# Patient Record
Sex: FEMALE
[De-identification: names, ages, dates, MRNs, and addresses within clinical notes are randomized per-mention and may not be internally consistent; named-entity substitution may affect disease eponyms.]

---

## 2020-11-19 ENCOUNTER — HOSPITAL ENCOUNTER (EMERGENCY)
Dept: HOSPITAL 97 - ER | Age: 50
Discharge: HOME | End: 2020-11-19
Payer: SELF-PAY

## 2020-11-19 VITALS — OXYGEN SATURATION: 99 % | SYSTOLIC BLOOD PRESSURE: 113 MMHG | TEMPERATURE: 98.2 F | DIASTOLIC BLOOD PRESSURE: 67 MMHG
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
## 2020-11-19 DIAGNOSIS — Z98.84: ICD-10-CM
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
## 2020-11-19 DIAGNOSIS — F32.9: ICD-10-CM
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
## 2020-11-19 DIAGNOSIS — S61.215A: Primary | ICD-10-CM

## 2020-11-19 PROCEDURE — 0JQK0ZZ REPAIR LEFT HAND SUBCUTANEOUS TISSUE AND FASCIA, OPEN APPROACH: ICD-10-PCS

## 2020-11-19 PROCEDURE — 99284 EMERGENCY DEPT VISIT MOD MDM: CPT

## 2020-11-19 NOTE — EDPHYS
Physician Documentation                                                                           

 [facility]                                                                 

Name: Gladis Reeves                                                                            

Age: 50 yrs                                                                                       

Sex: Female                                                                                       

: 1970                                                                                   

MRN: [id number]                                                                                   

Arrival Date: 2020                                                                          

Time: 09:03                                                                                       

Account#: [id number]                                                                            

Bed 13                                                                                            

Private MD:                                                                                       

Leo Blanco                                                                      

HPI:                                                                                              

                                                                                             

10:03 This 50 yrs old  Female presents to ER via Ambulatory with complaints of Finger kb  

      Injury - laceration.                                                                        

10:03 The patient has a laceration related to: cooking, from a knife, occurred at home, and   kb  

      there are no complicating factors. The injury was accidental. The laceration(s) is(are)     

      located on the palmar aspect of proximal phalanx of left ring finger. Onset: The            

      symptoms/episode began/occurred just prior to arrival. Associated signs and symptoms:       

      The patient has no apparent associated signs or symptoms. The patient has not               

      experienced similar symptoms in the past. The patient has not recently seen a physician.    

                                                                                                  

OB/GYN:                                                                                           

: LMP N/A - Post-menopause                                                                iw  

                                                                                                  

Historical:                                                                                       

- Allergies:                                                                                      

: No Known Allergies;                                                                     iw  

- Home Meds:                                                                                      

 Zoloft 100 mg Oral tab 1 tab once daily [Active]; Iron CR Oral [Active]; atenolol 12.5  iw  

      Oral tab 1 tab once daily [Active];                                                         

- PMHx:                                                                                           

 Anemia; Depression;                                                                     iw  

- PSHx:                                                                                           

 Gastric Bypass; Appendectomy;                                                           iw  

                                                                                                  

- Immunization history:: Adult Immunizations up to date, Last tetanus immunization: <             

  10 years ago.                                                                                   

- Social history:: Smoking status: Patient/guardian denies using.                                 

                                                                                                  

                                                                                                  

ROS:                                                                                              

10:02 Constitutional: Negative for fever, chills, and weight loss, MS/Extremity: Negative for kb  

      injury and deformity, Neuro: Negative for headache, weakness, numbness, tingling, and       

      seizure.                                                                                    

10:02 Skin: Positive for laceration(s), of the palmar aspect of proximal phalanx of left ring     

      finger.                                                                                     

                                                                                                  

Exam:                                                                                             

10:02 Constitutional:  This is a well developed, well nourished patient who is awake, alert,  kb  

      and in no acute distress. Head/Face:  Normocephalic, atraumatic. MS/ Extremity:  Pulses     

      equal, no cyanosis.  Neurovascular intact.  Full, normal range of motion. Neuro:  Awake     

      and alert, GCS 15, oriented to person, place, time, and situation.  Cranial nerves          

      II-XII grossly intact.  Motor strength 5/5 in all extremities.  Sensory grossly intact.     

       Cerebellar exam normal.  Normal gait.                                                      

10:02 Respiratory: the patient does not display signs of respiratory distress,  Respirations:     

      normal.                                                                                     

10:02 Skin: injury, laceration(s), the wound is approximately  2 cm(s), of the palmar aspect      

      of proximal phalanx of left ring finger, that can be described as clean, no foreign         

      body, linear, without bleeding.                                                             

                                                                                                  

Vital Signs:                                                                                      

09:16  / 67; Pulse 90; Resp 18; Temp 98.2; Pulse Ox 99% on R/A; Weight 82.55 kg; Height iw  

      5 ft. 7 in. (170.18 cm); Pain 9/10;                                                         

09:16 Body Mass Index 28.50 (82.55 kg, 170.18 cm)                                             iw  

                                                                                                  

Laceration:                                                                                       

09:55 Wound Repair of 2cm ( 0.8in ) subcutaneous laceration to palmar aspect of proximal      kb  

      phalanx of left ring finger. Linear shaped.. Distal neuro/vascular/tendon intact.           

      Anesthesia: Wound infiltrated with 1 mls of 1% lidocaine. Wound prep: Extensive             

      cleansing with hibiclenz by me, Wound irrigation with saline by me, Wound explored.         

      Skin closed with 4 5-0 Prolene using simple sutures and sterile technique. Dressed with     

      non-adherent dressing. Patient tolerated well.                                              

                                                                                                  

MDM:                                                                                              

09:30 Patient medically screened.                                                             kb  

09:56 Data reviewed: vital signs, nurses notes. Data interpreted: Pulse oximetry: on room air kb  

      is 99 %. Interpretation: normal. Counseling: I had a detailed discussion with the           

      patient and/or guardian regarding: the historical points, exam findings, and any            

      diagnostic results supporting the discharge/admit diagnosis, the need for outpatient        

      follow up, a family practitioner, to return to the emergency department if symptoms         

      worsen or persist or if there are any questions or concerns that arise at home.             

                                                                                                  

                                                                                             

09:36 Order name: Prolene, Sutures; Complete Time: 09:36                                      kb  

                                                                                             

09:36 Order name: Dressing - Wound; Complete Time: :36                                      kb  

                                                                                             

09:36 Order name: Gloves, Sterile; Complete Time: :36                                       kb  

                                                                                             

09:36 Order name: Setup Suture Tray; Complete Time: 09:36                                     kb  

                                                                                             

09:57 Order name: Finger Splint; Complete Time: 10:04                                         kb  

                                                                                                  

Administered Medications:                                                                         

09:35 Drug: Ibuprofen 800 mg Route: PO;                                                       kb  

10:00 Follow up: Response: No adverse reaction; Pain is decreased                             ca1 

10:05 Drug: Lidocaine (1 %) 1 vials Volume: 5 ml; Route: Infiltration;                        kb  

                                                                                                  

                                                                                                  

Disposition:                                                                                      

13:04 Co-signature as Attending Physician, Leo Krishnamurthy MD I agree with the assessment and  darwin 

      plan of care.                                                                               

                                                                                                  

Disposition:                                                                                      

20 09:57 Discharged to Home. Impression: Laceration without foreign body of left ring       

  finger without damage to nail.                                                                  

- Condition is Stable.                                                                            

- Discharge Instructions: Laceration Care, Adult, Easy-to-Read.                                   

                                                                                                  

- Medication Reconciliation Form, Thank You Letter, Antibiotic Education, Prescription            

  Opioid Use form.                                                                                

- Follow up: Private Physician; When: 2 - 3 days; Reason: Recheck today's complaints,             

  Continuance of care, Re-evaluation by your physician. Follow up: Emergency                      

  Department; When: As needed; Reason: Worsening of condition.                                    

                                                                                                  

                                                                                                  

                                                                                                  

Signatures:                                                                                       

Melissa Noriega, KAITLINP-C                 FNP-Leo Razo MD MD cha Williams, Irene, YANE                     RN   iw                                                   

Kenna Snyder RN                        RN   ca1                                                  

                                                                                                  

Corrections: (The following items were deleted from the chart)                                    

10:07 09:57 2020 09:57 Discharged to Home. Impression: Laceration without foreign body  ca1 

      of left ring finger without damage to nail. Condition is Stable. Forms are Medication       

      Reconciliation Form, Thank You Letter, Antibiotic Education, Prescription Opioid Use.       

      Follow up: Private Physician; When: 2 - 3 days; Reason: Recheck today's complaints,         

      Continuance of care, Re-evaluation by your physician. Follow up: Emergency Department;      

      When: As needed; Reason: Worsening of condition. kb                                         

                                                                                                  

**************************************************************************************************

## 2020-11-19 NOTE — ER
Nurse's Notes                                                                                     

 [facility]                                                                 

Name: Gladis Reeves                                                                            

Age: 50 yrs                                                                                       

Sex: Female                                                                                       

: 1970                                                                                   

MRN: [id number]                                                                                   

Arrival Date: 2020                                                                          

Time: 09:03                                                                                       

Account#: [id number]                                                                            

Bed 13                                                                                            

Private MD:                                                                                       

Diagnosis: Laceration without foreign body of left ring finger without damage to nail             

                                                                                                  

Presentation:                                                                                     

                                                                                             

09:16 Chief complaint: Patient states: i cut my left ring finer at 0800 this morning and im   iw  

      pretty sure im going to need stitches. Coronavirus screen: Client denies travel out of      

      the [place] in the last 14 days. Ebola Screen: Patient negative for fever greater than or      

      equal to 101.5 degrees Fahrenheit, and additional compatible Ebola Virus Disease            

      symptoms Patient denies exposure to infectious person. Patient denies travel to an          

      Ebola-affected area in the 21 days before illness onset. Initial Sepsis Screen: Does        

      the patient meet any 2 criteria? No. Patient's initial sepsis screen is negative. Does      

      the patient have a suspected source of infection? No. Patient's initial sepsis screen       

      is negative. Risk Assessment: Do you want to hurt yourself or someone else? Patient         

      reports no desire to harm self or others. Onset of symptoms was 2020. Care     

      prior to arrival: pt washed off laceration and placed a bandage.                            

09:16 Method Of Arrival: Ambulatory                                                           iw  

09:16 Acuity: THOMAS 4                                                                           iw  

                                                                                                  

Triage Assessment:                                                                                

09:19 General: Appears in no apparent distress. uncomfortable, Behavior is calm, cooperative, iw  

      appropriate for age. Pain: Complains of pain in palmar aspect of middle phalanx of left     

      ring finger and palmar aspect of proximal phalanx of left ring finger. EENT: No             

      deficits noted. No signs and/or symptoms were reported regarding the EENT system.           

      Neuro: No deficits noted. Cardiovascular: No deficits noted. Respiratory: No deficits       

      noted. GI: No deficits noted. No signs and/or symptoms were reported involving the          

      gastrointestinal system. : No deficits noted. No signs and/or symptoms were reported      

      regarding the genitourinary system. Derm: Skin laceration to anterior left ring finger      

      Skin is dry, Skin is pink, warm \T\ dry. Skin temperature is warm Wound noted palmar        

      aspect of middle phalanx of left ring finger and palmar aspect of proximal phalanx of       

      left ring finger Denies decreased tingling. Musculoskeletal: Capillary refill < 3           

      seconds, Range of motion: limited in PIP of left ring finger. Injury Description:           

      Laceration sustained to palmar aspect of middle phalanx of left ring finger and palmar      

      aspect of proximal phalanx of left ring finger is not bleeding, was sustained 1-2 hours     

      ago.                                                                                        

                                                                                                  

OB/GYN:                                                                                           

: LMP N/A - Post-menopause                                                                iw  

                                                                                                  

Historical:                                                                                       

- Allergies:                                                                                      

: No Known Allergies;                                                                     iw  

- Home Meds:                                                                                      

: Zoloft 100 mg Oral tab 1 tab once daily [Active]; Iron CR Oral [Active]; atenolol 12.5  iw  

      Oral tab 1 tab once daily [Active];                                                         

- PMHx:                                                                                           

 Anemia; Depression;                                                                     iw  

- PSHx:                                                                                           

: Gastric Bypass; Appendectomy;                                                           iw  

                                                                                                  

- Immunization history:: Adult Immunizations up to date, Last tetanus immunization: <             

  10 years ago.                                                                                   

- Social history:: Smoking status: Patient/guardian denies using.                                 

                                                                                                  

                                                                                                  

Screenin: Abuse screen: Denies threats or abuse. Denies injuries from another. Nutritional        ca1 

      screening: No deficits noted. Tuberculosis screening: No symptoms or risk factors           

      identified. Fall Risk None identified.                                                      

                                                                                                  

Assessment:                                                                                       

:55 General: Appears in no apparent distress. comfortable, Behavior is calm, cooperative,   ca1 

      appropriate for age. Pain: Complains of pain in PIP of left ring finger and palmar          

      aspect of proximal phalanx of left ring finger and palmar aspect of middle phalanx of       

      left ring finger. Neuro: Level of Consciousness is awake, alert, obeys commands,            

      Oriented to person, place, time, situation. Derm: Skin is healthy with good turgor,         

      Skin is pink, warm \T\ dry. Musculoskeletal: Circulation, motion, and sensation intact.     

      Capillary refill < 3 seconds. Injury Description: Laceration sustained to palmar aspect     

      of proximal phalanx of left ring finger and palmar aspect of middle phalanx of left         

      ring finger is clean, was sustained less than 30 minutes ago. no active bleeding noted      

      at this time.                                                                               

                                                                                                  

Vital Signs:                                                                                      

09:16  / 67; Pulse 90; Resp 18; Temp 98.2; Pulse Ox 99% on R/A; Weight 82.55 kg; Height iw  

      5 ft. 7 in. (170.18 cm); Pain 9/10;                                                         

09:16 Body Mass Index 28.50 (82.55 kg, 170.18 cm)                                             iw  

                                                                                                  

ED Course:                                                                                        

09:03 Patient arrived in ED.                                                                  as  

09:18 Triage completed.                                                                       iw  

09:19 Arm band placed on right wrist. Bandage applied.                                        iw  

09:30 [facility]                                                        kb  

09:30 Leo Krishnamurthy MD is Attending Physician.                                             kb  

09:55 Patient has correct armband on for positive identification. Bed in low position. Call   ca1 

      light in reach. Side rails up X 1. Pulse ox on. NIBP on.                                    

09:55 Assist provider with laceration repair on palmar aspect of proximal phalanx of left     ca1 

      ring finger and palmar aspect of middle phalanx of left ring finger that was 2.5 cm. or     

      less using sutures. Set up tray. Performed by Melissa SUÁREZ Dressed with 4X4s,      

      Patient tolerated well.                                                                     

09:55 Patient did not have IV access during this emergency room visit.                        ca1 

10:02 Kenna Snyder RN is Primary Nurse.                                                      ca1 

                                                                                                  

Administered Medications:                                                                         

09:35 Drug: Ibuprofen 800 mg Route: PO;                                                       kb  

10:00 Follow up: Response: No adverse reaction; Pain is decreased                             ca1 

10:05 Drug: Lidocaine (1 %) 1 vials Volume: 5 ml; Route: Infiltration;                          

                                                                                                  

                                                                                                  

Outcome:                                                                                          

09:57 Discharge ordered by MD.                                                                kb  

10:06 Discharged to home ambulatory.                                                          ca1 

10:06 Condition: stable                                                                           

10:06 Discharge instructions given to patient, Instructed on discharge instructions, follow       

      up and referral plans. Demonstrated understanding of instructions, follow-up care.          

10:07 Patient left the ED.                                                                    ca1 

                                                                                                  

Signatures:                                                                                       

Melissa Noriega FNP-C FNP-Ckb Martinez, Amelia                             as                                                   

Carmen Price, RN                     RN   iw                                                   

Kenna Snyder RN                        RN   ca1                                                  

                                                                                                  

**************************************************************************************************